# Patient Record
Sex: MALE | Race: WHITE | NOT HISPANIC OR LATINO | Employment: PART TIME | URBAN - METROPOLITAN AREA
[De-identification: names, ages, dates, MRNs, and addresses within clinical notes are randomized per-mention and may not be internally consistent; named-entity substitution may affect disease eponyms.]

---

## 2017-07-19 ENCOUNTER — RECORDS - HEALTHEAST (OUTPATIENT)
Dept: LAB | Facility: HOSPITAL | Age: 57
End: 2017-07-19

## 2017-07-19 LAB — AST SERPL W P-5'-P-CCNC: 19 U/L (ref 0–40)

## 2018-06-19 ENCOUNTER — RECORDS - HEALTHEAST (OUTPATIENT)
Dept: LAB | Facility: HOSPITAL | Age: 58
End: 2018-06-19

## 2018-06-19 LAB
AST SERPL W P-5'-P-CCNC: 32 U/L (ref 0–40)
BASOPHILS # BLD AUTO: 0 THOU/UL (ref 0–0.2)
BASOPHILS NFR BLD AUTO: 1 % (ref 0–2)
EOSINOPHIL # BLD AUTO: 0.1 THOU/UL (ref 0–0.4)
EOSINOPHIL NFR BLD AUTO: 3 % (ref 0–6)
ERYTHROCYTE [DISTWIDTH] IN BLOOD BY AUTOMATED COUNT: 12 % (ref 11–14.5)
HCT VFR BLD AUTO: 40.2 % (ref 40–54)
HGB BLD-MCNC: 13.7 G/DL (ref 14–18)
LYMPHOCYTES # BLD AUTO: 0.5 THOU/UL (ref 0.8–4.4)
LYMPHOCYTES NFR BLD AUTO: 13 % (ref 20–40)
MCH RBC QN AUTO: 31.6 PG (ref 27–34)
MCHC RBC AUTO-ENTMCNC: 34.1 G/DL (ref 32–36)
MCV RBC AUTO: 93 FL (ref 80–100)
MONOCYTES # BLD AUTO: 0.4 THOU/UL (ref 0–0.9)
MONOCYTES NFR BLD AUTO: 10 % (ref 2–10)
NEUTROPHILS # BLD AUTO: 3 THOU/UL (ref 2–7.7)
NEUTROPHILS NFR BLD AUTO: 73 % (ref 50–70)
PLATELET # BLD AUTO: 194 THOU/UL (ref 140–440)
PMV BLD AUTO: 10.8 FL (ref 8.5–12.5)
RBC # BLD AUTO: 4.33 MILL/UL (ref 4.4–6.2)
WBC: 4.2 THOU/UL (ref 4–11)

## 2019-03-06 ENCOUNTER — RECORDS - HEALTHEAST (OUTPATIENT)
Dept: LAB | Facility: HOSPITAL | Age: 59
End: 2019-03-06

## 2019-03-06 LAB
AST SERPL W P-5'-P-CCNC: 23 U/L (ref 0–40)
BASOPHILS # BLD AUTO: 0.1 THOU/UL (ref 0–0.2)
BASOPHILS NFR BLD AUTO: 1 % (ref 0–2)
EOSINOPHIL # BLD AUTO: 0.1 THOU/UL (ref 0–0.4)
EOSINOPHIL NFR BLD AUTO: 2 % (ref 0–6)
ERYTHROCYTE [DISTWIDTH] IN BLOOD BY AUTOMATED COUNT: 11.9 % (ref 11–14.5)
HCT VFR BLD AUTO: 40.6 % (ref 40–54)
HGB BLD-MCNC: 13.9 G/DL (ref 14–18)
LYMPHOCYTES # BLD AUTO: 0.7 THOU/UL (ref 0.8–4.4)
LYMPHOCYTES NFR BLD AUTO: 13 % (ref 20–40)
MCH RBC QN AUTO: 31.5 PG (ref 27–34)
MCHC RBC AUTO-ENTMCNC: 34.2 G/DL (ref 32–36)
MCV RBC AUTO: 92 FL (ref 80–100)
MONOCYTES # BLD AUTO: 0.5 THOU/UL (ref 0–0.9)
MONOCYTES NFR BLD AUTO: 9 % (ref 2–10)
NEUTROPHILS # BLD AUTO: 3.7 THOU/UL (ref 2–7.7)
NEUTROPHILS NFR BLD AUTO: 75 % (ref 50–70)
PLATELET # BLD AUTO: 193 THOU/UL (ref 140–440)
PMV BLD AUTO: 10.3 FL (ref 8.5–12.5)
RBC # BLD AUTO: 4.41 MILL/UL (ref 4.4–6.2)
TSH SERPL DL<=0.005 MIU/L-ACNC: 1.96 UIU/ML (ref 0.3–5)
VIT B12 SERPL-MCNC: 439 PG/ML (ref 213–816)
WBC: 5 THOU/UL (ref 4–11)

## 2019-08-27 ENCOUNTER — RECORDS - HEALTHEAST (OUTPATIENT)
Dept: LAB | Facility: HOSPITAL | Age: 59
End: 2019-08-27

## 2019-08-27 LAB
BASOPHILS # BLD AUTO: 0.1 THOU/UL (ref 0–0.2)
BASOPHILS NFR BLD AUTO: 1 % (ref 0–2)
EOSINOPHIL # BLD AUTO: 0.1 THOU/UL (ref 0–0.4)
EOSINOPHIL NFR BLD AUTO: 3 % (ref 0–6)
ERYTHROCYTE [DISTWIDTH] IN BLOOD BY AUTOMATED COUNT: 12.1 % (ref 11–14.5)
HCT VFR BLD AUTO: 39.5 % (ref 40–54)
HGB BLD-MCNC: 13.4 G/DL (ref 14–18)
LYMPHOCYTES # BLD AUTO: 0.6 THOU/UL (ref 0.8–4.4)
LYMPHOCYTES NFR BLD AUTO: 13 % (ref 20–40)
MCH RBC QN AUTO: 31.6 PG (ref 27–34)
MCHC RBC AUTO-ENTMCNC: 33.9 G/DL (ref 32–36)
MCV RBC AUTO: 93 FL (ref 80–100)
MONOCYTES # BLD AUTO: 0.5 THOU/UL (ref 0–0.9)
MONOCYTES NFR BLD AUTO: 11 % (ref 2–10)
NEUTROPHILS # BLD AUTO: 3.3 THOU/UL (ref 2–7.7)
NEUTROPHILS NFR BLD AUTO: 71 % (ref 50–70)
PLATELET # BLD AUTO: 183 THOU/UL (ref 140–440)
PMV BLD AUTO: 10.1 FL (ref 8.5–12.5)
RBC # BLD AUTO: 4.24 MILL/UL (ref 4.4–6.2)
WBC: 4.6 THOU/UL (ref 4–11)

## 2019-08-28 LAB — 25(OH)D3 SERPL-MCNC: 45.2 NG/ML (ref 30–80)

## 2020-11-24 PROBLEM — G35 RELAPSING REMITTING MULTIPLE SCLEROSIS (H): Status: ACTIVE | Noted: 2020-11-24

## 2021-03-26 DIAGNOSIS — R25.2 SPASTICITY: Primary | ICD-10-CM

## 2021-03-26 DIAGNOSIS — G35 RELAPSING REMITTING MULTIPLE SCLEROSIS (H): ICD-10-CM

## 2021-03-26 RX ORDER — BACLOFEN 10 MG/1
TABLET ORAL
Qty: 270 TABLET | Refills: 0 | Status: SHIPPED | OUTPATIENT
Start: 2021-03-26 | End: 2021-06-29

## 2021-03-26 NOTE — TELEPHONE ENCOUNTER
Refill request for baclofen.  Pt due for follow up appt. No future appt scheduled.  Letter mailed to pt.  Medication T'd for review and signature    Cristina Bo LPN on 3/26/2021 at 3:27 PM

## 2021-03-26 NOTE — LETTER
3/26/2021        RE: Festus Jackson  [address]  [facility] [address]              Dear Festus,        We recently provided you with medication refills.  Many medications require routine follow-up with your doctor.    Your prescription(s) have been refilled for 30 days so you may have time for the above noted follow-up. Please call to schedule soon so we can assure you have an appointment before your next refills are needed. If you have already made a follow up appointment, please disregard this letter.         Sincerely,        [facility] [place]     (Formerly known as [facility])

## 2021-06-29 DIAGNOSIS — R25.2 SPASTICITY: ICD-10-CM

## 2021-06-29 RX ORDER — BACLOFEN 10 MG/1
TABLET ORAL
Qty: 90 TABLET | Refills: 0 | Status: SHIPPED | OUTPATIENT
Start: 2021-06-29 | End: 2021-07-26

## 2021-06-29 NOTE — TELEPHONE ENCOUNTER
Refill request for baclofen.  Pt due for follow up. Letter has already been mailed to pt to schedule. No future appt scheduled.  Medication T'd for review and signature    Cristina Bo LPN on 6/29/2021 at 1:27 PM

## 2021-07-26 DIAGNOSIS — R25.2 SPASTICITY: ICD-10-CM

## 2021-07-26 RX ORDER — BACLOFEN 10 MG/1
10 TABLET ORAL 3 TIMES DAILY
Qty: 42 TABLET | Refills: 0 | Status: SHIPPED | OUTPATIENT
Start: 2021-07-26 | End: 2021-09-01

## 2021-07-26 NOTE — TELEPHONE ENCOUNTER
Refill request for baclofen  Letter already mailed to patient to schedule follow up  14 day supply with 0 refills Medication T'd for review and signature  Sushma Wheeler CMA on 7/26/2021 at 4:02 PM

## 2021-10-11 DIAGNOSIS — G35 MS (MULTIPLE SCLEROSIS) (H): ICD-10-CM

## 2021-10-11 RX ORDER — ESCITALOPRAM OXALATE 10 MG/1
10 TABLET ORAL DAILY
Qty: 5 TABLET | Refills: 0 | Status: SHIPPED | OUTPATIENT
Start: 2021-10-11 | End: 2022-05-04

## 2021-10-11 NOTE — TELEPHONE ENCOUNTER
Refill request for escitalopram.  Last seen 8/27/19. Letter has already been mailed to pt reminding to schedule. Previous refill for 7 day supply.  This refill for 5 day supply of Medication T'd for review and signature    Cristina Bo LPN on 10/11/2021 at 1:44 PM

## 2021-11-02 DIAGNOSIS — R25.2 SPASTICITY: ICD-10-CM

## 2021-11-02 RX ORDER — BACLOFEN 10 MG/1
TABLET ORAL
Qty: 15 TABLET | Refills: 0 | Status: SHIPPED | OUTPATIENT
Start: 2021-11-02 | End: 2021-12-07

## 2021-11-02 NOTE — TELEPHONE ENCOUNTER
Refill request for baclofen.  Last seen 8/27/19. Previous refill for 7 day supply.  5 day supply of Medication T'd for review and signature    Cristina Bo LPN on 11/2/2021 at 3:32 PM

## 2021-11-12 DIAGNOSIS — G35 RELAPSING REMITTING MULTIPLE SCLEROSIS (H): ICD-10-CM

## 2021-11-12 RX ORDER — GABAPENTIN 300 MG/1
CAPSULE ORAL
Qty: 180 CAPSULE | Refills: 1 | Status: SHIPPED | OUTPATIENT
Start: 2021-11-12 | End: 2022-05-04

## 2021-12-07 DIAGNOSIS — R25.2 SPASTICITY: ICD-10-CM

## 2021-12-07 RX ORDER — BACLOFEN 10 MG/1
TABLET ORAL
Qty: 90 TABLET | Refills: 5 | Status: SHIPPED | OUTPATIENT
Start: 2021-12-07 | End: 2022-05-04

## 2021-12-07 NOTE — TELEPHONE ENCOUNTER
Refill request for baclofen.  Pt has upcoming appt on 5/4/22.  Medication T'd for review and signature    Cristina Bo LPN on 12/7/2021 at 4:07 PM

## 2022-05-04 ENCOUNTER — OFFICE VISIT (OUTPATIENT)
Dept: NEUROLOGY | Facility: CLINIC | Age: 62
End: 2022-05-04
Payer: COMMERCIAL

## 2022-05-04 ENCOUNTER — LAB (OUTPATIENT)
Dept: LAB | Facility: HOSPITAL | Age: 62
End: 2022-05-04
Payer: COMMERCIAL

## 2022-05-04 VITALS
BODY MASS INDEX: 22.58 KG/M2 | HEART RATE: 66 BPM | HEIGHT: 68 IN | WEIGHT: 149 LBS | DIASTOLIC BLOOD PRESSURE: 85 MMHG | SYSTOLIC BLOOD PRESSURE: 130 MMHG

## 2022-05-04 DIAGNOSIS — G35 RELAPSING REMITTING MULTIPLE SCLEROSIS (H): ICD-10-CM

## 2022-05-04 DIAGNOSIS — F33.1 MODERATE EPISODE OF RECURRENT MAJOR DEPRESSIVE DISORDER (H): ICD-10-CM

## 2022-05-04 DIAGNOSIS — R25.2 SPASTICITY: ICD-10-CM

## 2022-05-04 DIAGNOSIS — R42 VERTIGO: ICD-10-CM

## 2022-05-04 DIAGNOSIS — G35 RELAPSING REMITTING MULTIPLE SCLEROSIS (H): Primary | ICD-10-CM

## 2022-05-04 DIAGNOSIS — G35 MS (MULTIPLE SCLEROSIS) (H): ICD-10-CM

## 2022-05-04 LAB
ANION GAP SERPL CALCULATED.3IONS-SCNC: 9 MMOL/L (ref 5–18)
AST SERPL W P-5'-P-CCNC: 28 U/L (ref 0–40)
BASOPHILS # BLD AUTO: 0 10E3/UL (ref 0–0.2)
BASOPHILS NFR BLD AUTO: 1 %
BUN SERPL-MCNC: 17 MG/DL (ref 8–22)
CALCIUM SERPL-MCNC: 9.9 MG/DL (ref 8.5–10.5)
CHLORIDE BLD-SCNC: 104 MMOL/L (ref 98–107)
CO2 SERPL-SCNC: 28 MMOL/L (ref 22–31)
CREAT SERPL-MCNC: 1.02 MG/DL (ref 0.7–1.3)
EOSINOPHIL # BLD AUTO: 0.1 10E3/UL (ref 0–0.7)
EOSINOPHIL NFR BLD AUTO: 2 %
ERYTHROCYTE [DISTWIDTH] IN BLOOD BY AUTOMATED COUNT: 11.9 % (ref 10–15)
GFR SERPL CREATININE-BSD FRML MDRD: 84 ML/MIN/1.73M2
GLUCOSE BLD-MCNC: 97 MG/DL (ref 70–125)
HCT VFR BLD AUTO: 42.6 % (ref 40–53)
HGB BLD-MCNC: 14.1 G/DL (ref 13.3–17.7)
IMM GRANULOCYTES # BLD: 0 10E3/UL
IMM GRANULOCYTES NFR BLD: 0 %
LYMPHOCYTES # BLD AUTO: 0.6 10E3/UL (ref 0.8–5.3)
LYMPHOCYTES NFR BLD AUTO: 14 %
MCH RBC QN AUTO: 31.1 PG (ref 26.5–33)
MCHC RBC AUTO-ENTMCNC: 33.1 G/DL (ref 31.5–36.5)
MCV RBC AUTO: 94 FL (ref 78–100)
MONOCYTES # BLD AUTO: 0.6 10E3/UL (ref 0–1.3)
MONOCYTES NFR BLD AUTO: 13 %
NEUTROPHILS # BLD AUTO: 3.1 10E3/UL (ref 1.6–8.3)
NEUTROPHILS NFR BLD AUTO: 70 %
NRBC # BLD AUTO: 0 10E3/UL
NRBC BLD AUTO-RTO: 0 /100
PLATELET # BLD AUTO: 166 10E3/UL (ref 150–450)
POTASSIUM BLD-SCNC: 4.2 MMOL/L (ref 3.5–5)
RBC # BLD AUTO: 4.53 10E6/UL (ref 4.4–5.9)
SODIUM SERPL-SCNC: 141 MMOL/L (ref 136–145)
WBC # BLD AUTO: 4.5 10E3/UL (ref 4–11)

## 2022-05-04 PROCEDURE — 85014 HEMATOCRIT: CPT

## 2022-05-04 PROCEDURE — 80048 BASIC METABOLIC PNL TOTAL CA: CPT

## 2022-05-04 PROCEDURE — 36415 COLL VENOUS BLD VENIPUNCTURE: CPT

## 2022-05-04 PROCEDURE — 99215 OFFICE O/P EST HI 40 MIN: CPT | Performed by: PSYCHIATRY & NEUROLOGY

## 2022-05-04 PROCEDURE — 84450 TRANSFERASE (AST) (SGOT): CPT

## 2022-05-04 RX ORDER — TERIFLUNOMIDE 14 MG/1
14 TABLET, FILM COATED ORAL DAILY
COMMUNITY

## 2022-05-04 RX ORDER — TAMSULOSIN HYDROCHLORIDE 0.4 MG/1
0.4 CAPSULE ORAL DAILY
COMMUNITY

## 2022-05-04 RX ORDER — ESCITALOPRAM OXALATE 10 MG/1
10 TABLET ORAL DAILY
Qty: 90 TABLET | Refills: 3 | Status: SHIPPED | OUTPATIENT
Start: 2022-05-04 | End: 2023-04-24

## 2022-05-04 RX ORDER — GABAPENTIN 300 MG/1
CAPSULE ORAL
Qty: 180 CAPSULE | Refills: 3 | Status: SHIPPED | OUTPATIENT
Start: 2022-05-04 | End: 2023-05-01

## 2022-05-04 RX ORDER — BACLOFEN 10 MG/1
TABLET ORAL
Qty: 180 TABLET | Refills: 3 | Status: SHIPPED | OUTPATIENT
Start: 2022-05-04 | End: 2023-05-01

## 2022-05-04 RX ORDER — MECLIZINE HYDROCHLORIDE 25 MG/1
25 TABLET ORAL 3 TIMES DAILY PRN
Qty: 90 TABLET | Refills: 4 | Status: SHIPPED | OUTPATIENT
Start: 2022-05-04 | End: 2022-11-21

## 2022-05-04 ASSESSMENT — MONTREAL COGNITIVE ASSESSMENT (MOCA)
10. [FLUENCY] NAME WORDS STARTING WITH DESIGNATED LETTER: 0
13. ORIENTATION SUBSCORE: 6
8. SERIAL SUBTRACTION OF 7S: 3
12. MEMORY INDEX SCORE: 3
VISUOSPATIAL/EXECUTIVE SUBSCORE: 5
7. [VIGILENCE] TAP WHEN HEARING DESIGNATED LETTER: 1
9. REPEAT EACH SENTENCE: 2
WHAT IS THE TOTAL SCORE (OUT OF 30): 27
11. FOR EACH PAIR OF WORDS, WHAT CATEGORY DO THEY BELONG TO (OUT OF 2): 2
6. READ LIST OF DIGITS [FORWARD/BACKWARD]: 2
WHAT LEVEL OF EDUCATION WAS ATTAINED: 0
4. NAME EACH OF THE THREE ANIMALS SHOWN: 3

## 2022-05-04 NOTE — PROGRESS NOTES
In person evaluation    HPI  8/27/2019, in person evaluation  5/4/2022, in person evaluation    62-year-old followed neurologically for:  Multiple sclerosis relapsing remitting  Chronic lymphopenia secondary to Tecfidera in the past  Depression    Last seen about 2 and half years ago      A.  Multiple sclerosis relapsing remitting       original diagnosis 1988, symptoms going on since age 18 or around 1978       lost to follow-up, re evaluated in 2001       Distant past treated with ACTH/multiple burst of steroids    MRI thoracic spine May 19, 2020  A.  Solitary midthoracic chronic demyelinating plaque  B.  No active demyelination  MRI cervical spine May 19, 2020  A.  3 upper cervical cord lesions consistent with MS chronic  B.  No active demyelinating lesions  MRI head May 19, 2020  A.  Moderate to severe T2 signal changes consistent with chronic MS supratentorial lesions       as lesions above need midbrain nettie and medulla as well as supratentorial  B.  No active lesions  C.  Thinning of the corpus callosum moderate in degree  D.  Moderate to severe T1 hypointense lesions  E.  Mild cerebral atrophy      Follows with a neurologist in [place] for his immune modulating medication  Stopped Tecfidera about a year ago and went on Aubagio         MS difficulties       Past optic neuritis left greater than right       Incoordination left greater than right       Urinary urgency       Difficulty with balance and gait       Depression/mood changes       Significant fatigue         disabled from competitive gainful employment due to MS.      B.  Lymphopenia secondary to Tecfidera in the past        Aubagio for about 1 year filled by his neurologist in [place]      C.  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              Brief review of neurologic history:  1. Multiple sclerosis, relapsing-remitting first diagnosed back in 1988 by Dr. Adelso Leon originally.  2. Lost to follow up and then again seen in our clinic in 2001.  3. Previously treated with ACTH early on and then multiple bursts of steroids for exacerbations in 2001 twice and 2003 and then in 2004, 2005, 2007, 2008, 2010, 2011, 2012. These were the large IV doses of steroids.    Patient s major difficulties have included:  1. Depression and mood problems with fairly anhedonic type symptoms.  2. Previous optic neuritis in the left eye greater than the right.  3. Difficulty with fluctuations with a lot of fatigue.  4. Incoordination worse on the left than the right, poor fine motor movements at times, symptoms worse with fatigue or being overheated.  5. Urinary urgency.  6. Gait difficulties and balance problems, which fluctuate with his fatigue, usually uses a single prong cane.    Family history:  1. Mother with MS in his biological mother.  2. Raised by adoptive parents.    Habits  Smoker  Alcohol 1-2/week  .  Previous work history summary included:  1. Worked as an  in 2004, had marked dexterity problems and troubles, was unable to continue with that occupation.  2. 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  3. 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  4. 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  5. Went back to school in 2012 at [employer] doing some computer graphics.  6. 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  7. 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  8. 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    Previous treatments have included:  1. ACTH early on.  2. Multiple sclerosis treated with high-dose burst of steroids.  3. Started Copaxone in 2004 and 2005.  4. Switched to Tecfidera in April 2016 due to needle fatigue.  5. Has been on lower doses of Tecfidera due to lymphopenia which is now chronic.      Past has had some oral bursts of steroids in:  A.  November 2016.  B.  July 2017.  C.  June 2018.  D. August 2019          Permanently disabled from competitive gainful employment  1. Disabled from competitive gainful employment which has been going on for quite some time.  2. Significant depression going on since 2003. Multiple medications tried in the past, currently on low-dose Lexapro which he tolerates.  3. A lot of fatigue phenomenon, talked about staying active and being safe with his gait.  4. Workup is outlined in the September 23, 2000 note for reference.  5. Previously had P100 latency of 160.5 on the left and 123 on the right when he had the optic neuritis.  MRI thoracic spine May 19, 2020  A.  Solitary midthoracic chronic demyelinating plaque  B.  No active demyelination  MRI cervical spine May 19, 2020  A.  3 upper cervical cord lesions consistent with MS chronic  B.  No active demyelinating lesions  MRI head May 19, 2020  A.  Moderate to severe T2 signal changes consistent with chronic MS supratentorial lesions       as lesions above need midbrain nettie and medulla as well as supratentorial  B.  No active lesions  C.  Thinning of the corpus callosum moderate in degree  D.  Moderate to severe T1 hypointense lesions  E.  Mild cerebral atrophy    Laboratory data review  Recent lymphocyte counts:  1. October 2016, 15%, absolute  count 0.6.  2. January 25, 2017 lymphocyte count 20%, absolute 1.0 on lower dose of Tecfidera.  3. July 19, 2017 lymphocyte count 18% with absolute count 0.7.  4. June 2018 lymphocyte 13% with absolute count 0.5.  5. March 2019, lymphocyte percent 13, absolute count 0.7    Laboratory review                        6/2019 8/2019 5/4/2022  Wbc/HGB     5.0/13.9      4.6/13.4     4.5/14.1    PLTs            193,000       183,000     166,000    Lymph           13/0.7         13/0.6        14/0.6   %/ABS    NA/K                                                   141/4.2    BUN/CR                                              17/1.02    GLU                                                     97      AST               23                                   28     B12               439    TSH              1.96    Vitamin D                            45.2      MoCA  9/4/2022     27 out of 30      Exam      Review of Systems     Pertinent findings on review of systems  Difficulty with fatigue  Difficulty with fluctuating mood  No actual diplopia dysarthria dysphasia  Clumsiness more on the left side  Gait difficulties with ataxia should be climbing on high ladders  Does use a walking stick    Does have some difficulty with his heartburn  Little bit of blurry vision intermittently    Has been placed on Flomax for some urinary urgency more so at nighttime still has it though    General exam  Alert orient x3  HEENT normal  Lungs clear  Heart regular  Abdomen soft  Symmetrical pulses  No edema the feet    Neurologic exam  Alert oriented x3  Prosody speech normal  Naming normal  Repetition normal  Comprehension normal  No neglect  Memory recall okay  MoCA 27 out of 30, 5/4/2022        Cranial 2 through 12 significant for  No ophthalmoplegia  Rare beat of nystagmus nonsustained intermittent subtle   visual fields intact  Visual fields intact  No Jamie Dandy phenomenon  Question blurriness disc margin chronic no red  desaturation      Upper extremities  Intention tremor bilaterally mild fluctuates  Dysmetria greater on the left than the right    Lower extremities  Relatively normal strength distal and proximal with negative straight leg raising sign    Gait  Slightly wide-based stance  does better with a assistive device if he is on uneven surfaces talked about gait safety    Reflexes fairly brisk no clonus equivocal Casey on the left   reflexes brisker on the left than the right      Assessment/Plan       1.  Multiple Sclerosis, Relapsing-Remitting (G35)        treated with Aubagio through his neurologist in [place] who is prescribing the medication      2.  Lymphopenia (D72.810)         Recheck CBC and platelets    3.  Left leg pain (M79.605)       Could be from his MS and some spasticity gets tired with too many medications      Add [employer]   topical magnesium rub    4.  Vertigo treated with meclizine as needed    5.  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       Currently is able to drive, does have a disability parking permit due to his high risk for falls.      Aubagio through his neurologist in [place]  Neurontin 300 mg capsule, 2 p.o. nightly for sleep and dysesthesias  Baclofen 10 mg p.o. 3 times daily for spasticity  Meclizine 25 mg 3 times daily as needed vertigo  Lexapro 10 mg once per day    Discussed depression and treatment      Check laboratory data  Counseled to stop smoking  Refilled medications    Follow-up in 6 months    As part of visit today  Reviewed MRI scan reports thoracic cervical and head performed in 2020  Reviewed urology notes 7/14/2020  Reviewed neurology notes from 2020    Total care time today 58 minutes.      Addendum 5/4/2022  Laboratory data 5/4/2022  Sodium 141 potassium 4.2  BUN 17 creatinine 1.02, glucose 97, AST 28  White blood count 4.5, hemoglobin 14.1, platelets 166,000  Lymphocyte percent 14, absolute 0.6           Yes

## 2022-05-04 NOTE — NURSING NOTE
[facility] COGNITIVE ASSESSMENT (MOCA)  Version 7.1 Original Version  VISUOSPATIAL/EXECUTIVE               COPY CUBE      [ 1   ]                                [ 1   ] DRAW CLOCK (Ten past eleven)  (3 points)    [ 1   ]                    [   1 ]               [ 1   ]       Contour            Numbers     Hands POINTS                  5 / 5   NAMING    [ 1  ]                                                                        [ 1   ]                                             [   1 ]  Lifredo Guadalupe                                Camel                    3 / 3   MEMORY Read list of words, subject must repeat them. Do 2 trials, even if 1st trial is successful. Do a recall after 5 minutes  FACE VELVET [place]que ELISA RED No Points    1st          2nd         ATTENTION Read list of digits (1 digit/sec) Subject has to repeat in the forward order       [  1  ]   2  1  8  5  4                                [  1  ] 7 4 2                        2  /2   Read list of letters. The subject must tap with his hand at each letter A. No points if > 2 errors.  [  1  ] F B A C M N A A J K L B A F A K D E A A A J A M O F A A B             1 /1   Serial 7 subtraction starting at 100          [ 1   ] 93         [  1  ] 86          [  1  ] 79          [ 1   ] 72         [  1  ] 65   4 or 5 correct subtractions: 3 points,  2 or 3 correct: 2 points,  1correct: 1 point,   0 correct: 0 points           3 /3   LANGUAGE Repeat: I only know that Kristian is the one to help today. [   1  ]                                      The cat always hid under the couch when dogs were in the room. [  1 ]              2 /2   Fluency: Name maximum number of words in one minute that begin with the letter F                                                                                                                    [  0  ] _6__ (N > 11 words)              0 /1   ABSTRACTION Similarity  between e.g. banana-orange=fruit                                                                   [   1 ] train-bicycle                      [  1  ] watch-ruler             2 /2   DELAYED  RECALL Has to recall words  WITH NO CUE FACE  [  1  ] VELVET  [   1 ] [facility]  [ 1   ]  ELISA  [  0  ] RED  [ 0   ] Points for UNCUED recall only           3 /5           OPTIONAL Category cue           Multiple choice cue          ORIENTATION  [  1  ] Date     [ 1   ] Month       [ 1   ] Year      [  1  ] Day      [   1 ] Place        [  1  ] City         6 /6   TOTAL  Normal > 26/30 Add 1 point if < 12 years education       27 /30

## 2022-05-04 NOTE — LETTER
May 4, 2022      Festus JARAMILLO Renetta  [address] [place] [address]        Dear ,    We are writing to inform you of your test results.    Laboratory data 5/4/2022  Sodium 141 potassium 4.2 BUN 17 creatinine 1.02, glucose 97, AST 28  White blood count 4.5, hemoglobin 14.1, platelets 166,000  Lymphocyte percent 14, absolute 0.6    Your laboratory data above are stable continue as planned  If you have any questions or concerns, please call the clinic at the number listed above.     Sincerely,      Dr. Jefry Mc

## 2022-05-04 NOTE — LETTER
5/4/2022         RE: Festus Jackson  [address]  [place] [address]        Dear Colleague,    Thank you for referring your patient, Festus Jackson, to the [facility] [place]. Please see a copy of my visit note below.    In person evaluation    HPI  8/27/2019, in person evaluation  5/4/2022, in person evaluation    62-year-old followed neurologically for:  Multiple sclerosis relapsing remitting  Chronic lymphopenia secondary to Tecfidera in the past  Depression    Last seen about 2 and half years ago      A.  Multiple sclerosis relapsing remitting       original diagnosis 1988, symptoms going on since age 18 or around 1978       lost to follow-up, re evaluated in 2001       Distant past treated with ACTH/multiple burst of steroids    MRI thoracic spine May 19, 2020  A.  Solitary midthoracic chronic demyelinating plaque  B.  No active demyelination  MRI cervical spine May 19, 2020  A.  3 upper cervical cord lesions consistent with MS chronic  B.  No active demyelinating lesions  MRI head May 19, 2020  A.  Moderate to severe T2 signal changes consistent with chronic MS supratentorial lesions       as lesions above need midbrain nettie and medulla as well as supratentorial  B.  No active lesions  C.  Thinning of the corpus callosum moderate in degree  D.  Moderate to severe T1 hypointense lesions  E.  Mild cerebral atrophy      Follows with a neurologist in [place] for his immune modulating medication  Stopped Tecfidera about a year ago and went on Aubagio         MS difficulties       Past optic neuritis left greater than right       Incoordination left greater than right       Urinary urgency       Difficulty with balance and gait       Depression/mood changes       Significant fatigue         disabled from competitive gainful employment due to MS.      B.  Lymphopenia secondary to Tecfidera in the past        Aubagio for about 1 year filled by his neurologist in [place]      C.  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              Brief review of neurologic history:  1. Multiple sclerosis, relapsing-remitting first diagnosed back in 1988 by Dr. Adelso Leon originally.  2. Lost to follow up and then again seen in our clinic in 2001.  3. Previously treated with ACTH early on and then multiple bursts of steroids for exacerbations in 2001 twice and 2003 and then in 2004, 2005, 2007, 2008, 2010, 2011, 2012. These were the large IV doses of steroids.    Patient s major difficulties have included:  1. Depression and mood problems with fairly anhedonic type symptoms.  2. Previous optic neuritis in the left eye greater than the right.  3. Difficulty with fluctuations with a lot of fatigue.  4. Incoordination worse on the left than the right, poor fine motor movements at times, symptoms worse with fatigue or being overheated.  5. Urinary urgency.  6. Gait difficulties and balance problems, which fluctuate with his fatigue, usually uses a single prong cane.    Family history:  1. Mother with MS in his biological mother.  2. Raised by adoptive parents.    Habits  Smoker  Alcohol 1-2/week  .  Previous work history summary included:  1. Worked as an  in 2004, had marked dexterity problems and troubles, was unable to continue with that occupation.  2. 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  3. 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  4. 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  5. Went back to school in 2012 at [employer] doing some computer graphics.  6. 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  8. 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    Previous treatments have included:  1. ACTH early on.  2. Multiple sclerosis treated with high-dose burst of steroids.  3. Started Copaxone in 2004 and 2005.  4. Switched to Tecfidera in April 2016 due to needle fatigue.  5. Has been on lower doses of Tecfidera due to lymphopenia which is now chronic.      Past has had some oral bursts of steroids in:  A.  November 2016.  B.  July 2017.  C.  June 2018.  D. August 2019          Permanently disabled from competitive gainful employment  1. Disabled from competitive gainful employment which has been going on for quite some time.  2. Significant depression going on since 2003. Multiple medications tried in the past, currently on low-dose Lexapro which he tolerates.  3. A lot of fatigue phenomenon, talked about staying active and being safe with his gait.  4. Workup is outlined in the September 23, 2000 note for reference.  5. Previously had P100 latency of 160.5 on the left and 123 on the right when he had the optic neuritis.  MRI thoracic spine May 19, 2020  A.  Solitary midthoracic chronic demyelinating plaque  B.  No active demyelination  MRI cervical spine May 19, 2020  A.  3 upper cervical cord lesions consistent with MS chronic  B.  No active demyelinating lesions  MRI head May 19, 2020  A.  Moderate to severe T2 signal changes consistent with chronic MS supratentorial lesions       as lesions above need midbrain nettie and medulla as well  as supratentorial  B.  No active lesions  C.  Thinning of the corpus callosum moderate in degree  D.  Moderate to severe T1 hypointense lesions  E.  Mild cerebral atrophy    Laboratory data review  Recent lymphocyte counts:  1. October 2016, 15%, absolute count 0.6.  2. January 25, 2017 lymphocyte count 20%, absolute 1.0 on lower dose of Tecfidera.  3. July 19, 2017 lymphocyte count 18% with absolute count 0.7.  4. June 2018 lymphocyte 13% with absolute count 0.5.  5. March 2019, lymphocyte percent 13, absolute count 0.7    Laboratory review                        6/2019 8/2019  Wbc/HGB     5.0/13.9      4.6/13.4    PLTs            193,000       183,000    Lymph           13/0.7         13/0.6  %/ABS    AST               23    B12               439    TSH              1.96    Vitamin D                            45.2      MoCA  9/4/2022     27 out of 30      Exam      Review of Systems     Pertinent findings on review of systems  Difficulty with fatigue  Difficulty with fluctuating mood  No actual diplopia dysarthria dysphasia  Clumsiness more on the left side  Gait difficulties with ataxia should be climbing on high ladders  Does use a walking stick    Does have some difficulty with his heartburn  Little bit of blurry vision intermittently    Has been placed on Flomax for some urinary urgency more so at nighttime still has it though    General exam  Alert orient x3  HEENT normal  Lungs clear  Heart regular  Abdomen soft  Symmetrical pulses  No edema the feet    Neurologic exam  Alert oriented x3  Prosody speech normal  Naming normal  Repetition normal  Comprehension normal  No neglect  Memory recall okay  MoCA 27 out of 30, 5/4/2022        Cranial 2 through 12 significant for  No ophthalmoplegia  Rare beat of nystagmus nonsustained intermittent subtle   visual fields intact  Visual fields intact  No Jamie Dandy phenomenon  Question blurriness disc margin chronic no red desaturation      Upper  extremities  Intention tremor bilaterally mild fluctuates  Dysmetria greater on the left than the right    Lower extremities  Relatively normal strength distal and proximal with negative straight leg raising sign    Gait  Slightly wide-based stance  does better with a assistive device if he is on uneven surfaces talked about gait safety    Reflexes fairly brisk no clonus equivocal Casey on the left   reflexes brisker on the left than the right      Assessment/Plan       1.  Multiple Sclerosis, Relapsing-Remitting (G35)        treated with Aubagio through his neurologist in [place] who is prescribing the medication      2.  Lymphopenia (D72.810)         Recheck CBC and platelets    3.  Left leg pain (M79.605)       Could be from his MS and some spasticity gets tired with too many medications      Add [employer]   topical magnesium rub    4.  Vertigo treated with meclizine as needed    5.  Permanently disabled from competitive gainful employment       Is disabled from competitive gainful employment,        In the past does a little bit of part-time teaching of [employer], and sometimes [employer],        works less than 15 hours per week.       Currently is able to drive, does have a disability parking permit due to his high risk for falls.      Ralph through his neurologist in [place]  Neurontin 300 mg capsule, 2 p.o. nightly for sleep and dysesthesias  Baclofen 10 mg p.o. 3 times daily for spasticity  Meclizine 25 mg 3 times daily as needed vertigo  Lexapro 10 mg once per day    Discussed depression and treatment      Check laboratory data  Counseled to stop smoking  Refilled medications    Follow-up in 6 months    As part of visit today  Reviewed MRI scan reports thoracic cervical and head performed in 2020  Reviewed urology notes 7/14/2020  Reviewed neurology notes from 2020    Total care time today 58 minutes.                    Again, thank you for allowing me to participate in the care of your patient.         Sincerely,        Jefry Mc MD

## 2022-05-04 NOTE — NURSING NOTE
Chief Complaint   Patient presents with     Multiple Sclerosis     Pt states he is doing well. Left leg is getting weaker.     Cristina Bo LPN on 5/4/2022 at 10:08 AM

## 2022-11-21 DIAGNOSIS — R42 VERTIGO: ICD-10-CM

## 2022-11-21 DIAGNOSIS — G35 MS (MULTIPLE SCLEROSIS) (H): ICD-10-CM

## 2022-11-21 RX ORDER — MECLIZINE HYDROCHLORIDE 25 MG/1
25 TABLET ORAL 3 TIMES DAILY PRN
Qty: 30 TABLET | Refills: 0 | Status: SHIPPED | OUTPATIENT
Start: 2022-11-21 | End: 2023-09-26

## 2022-11-21 NOTE — TELEPHONE ENCOUNTER
Pt has appt scheduled today.     Tl sent refill request for Meclizine 25MG 1 tab TID. #90    Routing to provider as FYI to refill at today's appt 2pm.     Shane Watkins RN, BSN  [facility]

## 2023-04-24 DIAGNOSIS — G35 MS (MULTIPLE SCLEROSIS) (H): ICD-10-CM

## 2023-04-24 RX ORDER — ESCITALOPRAM OXALATE 10 MG/1
10 TABLET ORAL DAILY
Qty: 90 TABLET | Refills: 2 | Status: SHIPPED | OUTPATIENT
Start: 2023-04-24 | End: 2024-02-19

## 2023-04-24 NOTE — TELEPHONE ENCOUNTER
Refill request for: escitalopram 10mg   Directions: Take 1 tablet (10 mg) by mouth daily     LOV: 05/04/22  NOV: 11/28/23    90 day supply with 2 refills Medication T'd for review and signature      Cristina Bo LPN on 4/24/2023 at 10:38 AM

## 2023-05-01 DIAGNOSIS — G35 RELAPSING REMITTING MULTIPLE SCLEROSIS (H): ICD-10-CM

## 2023-05-01 DIAGNOSIS — R25.2 SPASTICITY: ICD-10-CM

## 2023-05-01 RX ORDER — GABAPENTIN 300 MG/1
CAPSULE ORAL
Qty: 180 CAPSULE | Refills: 1 | Status: SHIPPED | OUTPATIENT
Start: 2023-05-01 | End: 2023-10-30

## 2023-05-01 RX ORDER — BACLOFEN 10 MG/1
TABLET ORAL
Qty: 270 TABLET | Refills: 1 | Status: SHIPPED | OUTPATIENT
Start: 2023-05-01 | End: 2024-07-01

## 2023-05-01 NOTE — TELEPHONE ENCOUNTER
Refill request for: gabapentin 300mg   Directions: 2 capsules at bedtime    Refill request for: baclofen 10mg   Directions: : TAKE 1 TABLET BY MOUTH TWICE TIMES DAILY    LOV: 05/04/22  NOV: 11/28/23    90 day supply with 1 refills Medication T'd for review and signature    Cristina Bo LPN on 5/1/2023 at 11:02 AM

## 2023-05-01 NOTE — TELEPHONE ENCOUNTER
Tl sent refill requests for Gabapentin 300 mg 2 tabs at bedtime # 180  and Baclofen 10 mg 1 tab bid # 180

## 2023-09-25 DIAGNOSIS — R42 VERTIGO: ICD-10-CM

## 2023-09-25 DIAGNOSIS — G35 MS (MULTIPLE SCLEROSIS) (H): ICD-10-CM

## 2023-09-25 NOTE — TELEPHONE ENCOUNTER
Refill request for: meclizine (ANTIVERT) 25 MG tablet    Directions: Take 1 tablet (25 mg) by mouth 3 times daily as needed for dizziness     LOV: 5/4/22  NOV: 11/28/23    #30 with 0 refills Medication T'd for review and signature    Sushma Wheeler CMA on 9/25/2023 at 12:11 PM  [facility]

## 2023-09-26 RX ORDER — MECLIZINE HYDROCHLORIDE 25 MG/1
25 TABLET ORAL 3 TIMES DAILY PRN
Qty: 30 TABLET | Refills: 0 | Status: SHIPPED | OUTPATIENT
Start: 2023-09-26 | End: 2023-10-23

## 2023-10-23 DIAGNOSIS — R42 VERTIGO: ICD-10-CM

## 2023-10-23 DIAGNOSIS — G35 MS (MULTIPLE SCLEROSIS) (H): ICD-10-CM

## 2023-10-23 RX ORDER — MECLIZINE HYDROCHLORIDE 25 MG/1
25 TABLET ORAL 3 TIMES DAILY PRN
Qty: 30 TABLET | Refills: 0 | Status: SHIPPED | OUTPATIENT
Start: 2023-10-23 | End: 2023-12-29

## 2023-10-23 NOTE — TELEPHONE ENCOUNTER
Refill request for: Meclizine    Directions: Take 1 tablet TID as needed      LOV: 11/21/22  NOV: 11/28/23    30 day supply with 0 refills Medication T'd for review and signature

## 2023-10-29 DIAGNOSIS — G35 RELAPSING REMITTING MULTIPLE SCLEROSIS (H): ICD-10-CM

## 2023-10-30 RX ORDER — GABAPENTIN 300 MG/1
CAPSULE ORAL
Qty: 60 CAPSULE | Refills: 0 | Status: SHIPPED | OUTPATIENT
Start: 2023-10-30 | End: 2023-12-04

## 2023-10-30 NOTE — TELEPHONE ENCOUNTER
Refill request for: gabapentin (NEURONTIN) 300 MG capsule    Directions: 2 capsules at bedtime     LOV: 5/4/22  NOV: 11/28/23    30 day supply with 0 refills Medication T'd for review and signature  Sushma Wheeler CMA on 10/30/2023 at 12:10 PM  [facility]

## 2023-12-04 DIAGNOSIS — G35 RELAPSING REMITTING MULTIPLE SCLEROSIS (H): ICD-10-CM

## 2023-12-04 RX ORDER — GABAPENTIN 300 MG/1
CAPSULE ORAL
Qty: 60 CAPSULE | Refills: 7 | Status: SHIPPED | OUTPATIENT
Start: 2023-12-04 | End: 2024-07-01

## 2023-12-04 NOTE — TELEPHONE ENCOUNTER
Refill request for: gabapentin 300mg   Directions: TAKE 2 CAPSULES BY MOUTH AT BEDTIME     LOV: 05/04/22  NOV: 07/02/24    30 day supply with 7 refills Medication T'd for review and signature    Cristina Bo LPN on 12/4/2023 at 12:49 PM

## 2023-12-28 DIAGNOSIS — G35 MS (MULTIPLE SCLEROSIS) (H): ICD-10-CM

## 2023-12-28 DIAGNOSIS — R42 VERTIGO: ICD-10-CM

## 2023-12-28 NOTE — TELEPHONE ENCOUNTER
Refill request for: Meclizine    Directions: Take 1 tablet TID as needed     LOV: 11/21/22  NOV: 7/2/24 ( Cancelled 11/28/23)     30 day supply with 1 refills Medication T'd for review and signature

## 2023-12-29 RX ORDER — MECLIZINE HYDROCHLORIDE 25 MG/1
25 TABLET ORAL 3 TIMES DAILY PRN
Qty: 30 TABLET | Refills: 1 | Status: SHIPPED | OUTPATIENT
Start: 2023-12-29 | End: 2024-05-22

## 2024-02-19 DIAGNOSIS — G35 MS (MULTIPLE SCLEROSIS) (H): ICD-10-CM

## 2024-02-19 RX ORDER — ESCITALOPRAM OXALATE 10 MG/1
10 TABLET ORAL DAILY
Qty: 30 TABLET | Refills: 0 | Status: SHIPPED | OUTPATIENT
Start: 2024-02-19 | End: 2024-07-01

## 2024-02-19 NOTE — TELEPHONE ENCOUNTER
Refill request for: escitalopram (LEXAPRO) 10 MG tablet    Directions: Take 1 tablet (10 mg) by mouth daily     LOV: 5/4/22  NOV: 7/2/24- Per notes, appt needs to be re-scheduled as MD is out of office. Patient was a NO SHOW 11/21/22 and canceled November 2023 visit.     30 day supply with 0 refills Medication T'd for review and signature  Sushma Wheeler CMA on 2/19/2024 at 12:07 PM  [facility]

## 2024-05-22 DIAGNOSIS — R42 VERTIGO: ICD-10-CM

## 2024-05-22 DIAGNOSIS — G35 MS (MULTIPLE SCLEROSIS) (H): ICD-10-CM

## 2024-05-22 RX ORDER — MECLIZINE HYDROCHLORIDE 25 MG/1
25 TABLET ORAL 3 TIMES DAILY PRN
Qty: 30 TABLET | Refills: 0 | Status: SHIPPED | OUTPATIENT
Start: 2024-05-22 | End: 2024-07-01

## 2024-05-22 NOTE — TELEPHONE ENCOUNTER
Refill request for: Meclizine      Directions: Take 1 tablet TID as needed      LOV: 11/21/22  NOV: 7/2/24 ( Cancelled 11/28/23)      30 day supply with 0 refills Medication T'd for review and signature   Sushma Wheeler CMA on 5/22/2024 at 10:53 AM  [facility]

## 2024-06-28 NOTE — PROGRESS NOTES
In person evaluation        HPI  8/27/2019, in person evaluation  5/4/2022, in person evaluation  11/21/2022, no-show visit  7/1/2024, in person visit    64-year-old followed neurologically for:  Multiple sclerosis relapsing remitting  Chronic lymphopenia secondary to Tecfidera in the past  Depression    Since last seen May 2022 greater than 2 years ago      Does use meclizine for chronic dizziness  Was seen by urologist 10/13/2023 for difficulty with nocturia  Does have some BPH has had cystoscope be in the past 7/2020  Has been on some Flomax/Proscar  Postvoid residual 23 mL (7/1/2024  Follows with a neurologist in [place] who had him on Aubagio    Patient has had a lot of dry mouth which could be from his medications  I noticed that he was actually on 3 medicines for his bladder and probably should stop the Ditropan    Since last seen  No hospitalization  No surgeries  No major illnesses that required care  No falls or injuries    Chronic symptoms  Adynamic/depression  Dry mouth with trouble swallowing  Ataxic  Decreased vibration sense lower extremities worse than upper    Lives with his girlfriend  They both do some cooking  Does eat  Weight is stable at around 137 pounds  Does take his vitamin D3  Aubagio filled by other neurologist    Does do art work with acrylic painting he showed me multiple projects  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  Multiple sclerosis relapsing remitting       original diagnosis 1988, symptoms going on since age 18 or around 1978       lost to follow-up, re evaluated in 2001       Distant past treated with ACTH/multiple burst of steroids    MRI thoracic spine May 19, 2020  A.  Solitary midthoracic chronic demyelinating plaque  B.  No active demyelination  MRI cervical spine May 19, 2020  A.  3 upper cervical cord lesions consistent with MS  "chronic  B.  No active demyelinating lesions  MRI head May 19, 2020  A.  Moderate to severe T2 signal changes consistent with chronic MS supratentorial lesions       as lesions above need midbrain nettie and medulla as well as supratentorial  B.  No active lesions  C.  Thinning of the corpus callosum moderate in degree  D.  Moderate to severe T1 hypointense lesions  E.  Mild cerebral atrophy      Follows with a neurologist in [place] for his immune modulating medication  Stopped Tecfidera about a year ago and went on Aubagio         MS difficulties       Past optic neuritis left greater than right       Incoordination left greater than right       Urinary urgency       Difficulty with balance and gait       Depression/mood changes       Significant fatigue         disabled from competitive gainful employment due to MS.      B.  Lymphopenia secondary to Tecfidera in the past        Aubagio for about 1 year filled by his neurologist in [place]      C.  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              Brief review of neurologic history:  1. Multiple sclerosis, relapsing-remitting first diagnosed back in 1988 by Dr. Adelso Leon originally.  2. Lost to follow up and then again seen in our clinic in 2001.  3. Previously treated with ACTH early on and then multiple bursts of steroids for exacerbations in 2001 twice and 2003 and then in 2004, 2005, 2007, 2008, 2010, 2011, 2012. These were the large IV doses of steroids.    Patient s major difficulties have included:  1. Depression and mood problems with fairly anhedonic type symptoms.  2. Previous optic neuritis in the left eye greater than " the right.  3. Difficulty with fluctuations with a lot of fatigue.  4. Incoordination worse on the left than the right, poor fine motor movements at times, symptoms worse with fatigue or being overheated.  5. Urinary urgency.  6. Gait difficulties and balance problems, which fluctuate with his fatigue, usually uses a single prong cane.    Family history:  1. Mother with MS in his biological mother.  2. Raised by adoptive parents.    Habits  Smoker  Alcohol 1-2/week  .  Previous work history summary included:  1. Worked as an  in 2004, had marked dexterity problems and troubles, was unable to continue with that occupation.  2. 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  3. 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  4. 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  5. Went back to school in 2012 at [employer] doing some computer graphics.  6. 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  8. 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    Previous treatments have included:  1. ACTH early on.  2. Multiple sclerosis treated with high-dose burst of steroids.  3. Started Copaxone in 2004 and 2005.  4. Switched to Tecfidera in April 2016 due to needle fatigue.  5. Has been on lower doses of Tecfidera due to lymphopenia which is now chronic.      Past has had some oral bursts of steroids in:  A.  November 2016.  B.  July 2017.  C.  June 2018.  D. August 2019          Permanently  disabled from competitive gainful employment  1. Disabled from competitive gainful employment which has been going on for quite some time.  2. Significant depression going on since 2003. Multiple medications tried in the past, currently on low-dose Lexapro which he tolerates.  3. A lot of fatigue phenomenon, talked about staying active and being safe with his gait.  4. Workup is outlined in the September 23, 2000 note for reference.  5. Previously had P100 latency of 160.5 on the left and 123 on the right when he had the optic neuritis.  MRI thoracic spine May 19, 2020  A.  Solitary midthoracic chronic demyelinating plaque  B.  No active demyelination  MRI cervical spine May 19, 2020  A.  3 upper cervical cord lesions consistent with MS chronic  B.  No active demyelinating lesions  MRI head May 19, 2020  A.  Moderate to severe T2 signal changes consistent with chronic MS supratentorial lesions       as lesions above need midbrain nettie and medulla as well as supratentorial  B.  No active lesions  C.  Thinning of the corpus callosum moderate in degree  D.  Moderate to severe T1 hypointense lesions  E.  Mild cerebral atrophy    Laboratory data review  Recent lymphocyte counts:  1. October 2016, 15%, absolute count 0.6.  2. January 25, 2017 lymphocyte count 20%, absolute 1.0 on lower dose of Tecfidera.  3. July 19, 2017 lymphocyte count 18% with absolute count 0.7.  4. June 2018 lymphocyte 13% with absolute count 0.5.  5. March 2019, lymphocyte percent 13, absolute count 0.7    Laboratory review                        6/2019 8/2019 5/4/2022  Wbc/HGB     5.0/13.9      4.6/13.4     4.5/14.1  PLTs            193,000       183,000     166,000  Lymph           13/0.7         13/0.6        14/0.6   %/ABS    NA/K                                                   141/4.2  BUN/CR                                              17/1.02  GLU                                                     97  AST               23                                   28  B12               439  TSH              1.96    Vitamin D                            45.2      MoCA  5/4/2022     27 out of 30  7/1/2024     26 out of 30    Exam      Review of Systems     Pertinent findings on review of systems  Difficulty with fatigue  Difficulty with fluctuating mood  No actual diplopia dysarthria dysphasia  Clumsiness more on the left side  Gait difficulties with ataxia should be climbing on high ladders  Does use a walking stick    Does have some difficulty with his heartburn  Little bit of blurry vision intermittently    Has been placed on Flomax for some urinary urgency more so at nighttime still has it though  Also uses some Ditropan follows with urology, postvoid residual small    General exam  Blood pressure 113/65, pulse 76  Alert orient x3  HEENT normal  Lungs clear  Heart regular  Abdomen soft  Symmetrical pulses  No edema the feet    Neurologic exam  Alert oriented x3  Prosody speech normal  Naming normal  Repetition normal  Comprehension normal  No neglect  Memory recall okay    MoCA  5/4/2022     27 out of 30  7/1/2024     26 out of 30        Cranial 2 through 12 significant for  No ophthalmoplegia  Rare beat of nystagmus nonsustained intermittent subtle  Visual fields intact  No Jamie Dandy phenomenon  Decreased vision in the left eye compared to the right  Slight red desaturation on the left compared to the right subtle  Optokinetic nystagmus seems intact      Upper extremities  Intention tremor bilaterally mild fluctuates  Dysmetria greater on the left than the right    Lower extremities  Relatively normal strength distal and proximal with negative straight leg raising sign    Gait  Slightly wide-based stance  does better with a assistive device if he is on uneven surfaces talked about gait safety  Some difficulty with marching in place with his eyes closed even with his legs far apart    Reflexes fairly brisk no clonus equivocal Casey on the  left  Reflexes brisker on the left than the right    Decreased vibratory sense in the lower extremities compared to the upper        Assessment/Plan       1.  Multiple Sclerosis, Relapsing-Remitting (G35)        treated with Aubagio through his neurologist in [place] who is prescribing the medication      2.  Lymphopenia (D72.810)         Recheck CBC and platelets    3.  Left leg pain (M79.605)       Could be from his MS and some spasticity gets tired with too many medications      Add Theraworks   topical magnesium rub    4.  Vertigo treated with meclizine as needed    5.  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       Currently is able to drive, does have a disability parking permit due to his high risk for falls.    MoCA  5/4/2022     27 out of 30  7/1/2024     26 out of 30        Aubagio through his neurologist in [place]  Neurontin 300 mg capsule, 2 p.o. nightly for sleep and dysesthesias  Baclofen 10 mg p.o. 3 times daily for spasticity (sometimes just uses twice per day)  Meclizine 25 mg 3 times daily as needed vertigo  Lexapro 10 mg once per day    Discussed depression and treatment    Recommend getting laboratory data  Refilled medications  Labs ordered    Multiple issues as above discussed and reviewed  Follow-up in 1 year    Total care time today 40 minutes  The longitudinal plan of care for the diagnosis(es)/condition(s) as documented were addressed during this visit. Due to the added complexity in care, I will continue to support Festus in the subsequent management and with ongoing continuity of care.      As per the visit today  Reviewed EMR notes  Reviewed urology notes  Reviewed medications  Refilled medications and discussed side effects and benefits  Labs ordered

## 2024-07-01 ENCOUNTER — OFFICE VISIT (OUTPATIENT)
Dept: NEUROLOGY | Facility: CLINIC | Age: 64
End: 2024-07-01
Payer: COMMERCIAL

## 2024-07-01 VITALS
SYSTOLIC BLOOD PRESSURE: 113 MMHG | HEIGHT: 68 IN | WEIGHT: 140 LBS | DIASTOLIC BLOOD PRESSURE: 65 MMHG | HEART RATE: 76 BPM | BODY MASS INDEX: 21.22 KG/M2

## 2024-07-01 DIAGNOSIS — G35 MS (MULTIPLE SCLEROSIS) (H): ICD-10-CM

## 2024-07-01 DIAGNOSIS — G35 RELAPSING REMITTING MULTIPLE SCLEROSIS (H): Primary | ICD-10-CM

## 2024-07-01 DIAGNOSIS — R42 VERTIGO: ICD-10-CM

## 2024-07-01 DIAGNOSIS — R25.2 SPASTICITY: ICD-10-CM

## 2024-07-01 PROCEDURE — 99215 OFFICE O/P EST HI 40 MIN: CPT | Performed by: PSYCHIATRY & NEUROLOGY

## 2024-07-01 PROCEDURE — G2211 COMPLEX E/M VISIT ADD ON: HCPCS | Performed by: PSYCHIATRY & NEUROLOGY

## 2024-07-01 RX ORDER — BACLOFEN 10 MG/1
TABLET ORAL
Qty: 270 TABLET | Refills: 3 | Status: SHIPPED | OUTPATIENT
Start: 2024-07-01

## 2024-07-01 RX ORDER — GABAPENTIN 300 MG/1
CAPSULE ORAL
Qty: 180 CAPSULE | Refills: 3 | Status: SHIPPED | OUTPATIENT
Start: 2024-07-01

## 2024-07-01 RX ORDER — ATORVASTATIN CALCIUM 80 MG/1
1 TABLET, FILM COATED ORAL
COMMUNITY
Start: 2024-04-29

## 2024-07-01 RX ORDER — FINASTERIDE 5 MG/1
TABLET, FILM COATED ORAL
COMMUNITY

## 2024-07-01 RX ORDER — OXYBUTYNIN CHLORIDE 5 MG/1
TABLET ORAL
COMMUNITY
Start: 2024-01-10 | End: 2024-07-01

## 2024-07-01 RX ORDER — ESCITALOPRAM OXALATE 10 MG/1
10 TABLET ORAL DAILY
Qty: 90 TABLET | Refills: 3 | Status: SHIPPED | OUTPATIENT
Start: 2024-07-01

## 2024-07-01 RX ORDER — MECLIZINE HYDROCHLORIDE 25 MG/1
25 TABLET ORAL 3 TIMES DAILY PRN
Qty: 30 TABLET | Refills: 3 | Status: SHIPPED | OUTPATIENT
Start: 2024-07-01

## 2024-07-01 ASSESSMENT — MONTREAL COGNITIVE ASSESSMENT (MOCA)
13. ORIENTATION SUBSCORE: 6
VISUOSPATIAL/EXECUTIVE SUBSCORE: 4
WHAT LEVEL OF EDUCATION WAS ATTAINED: 0
WHAT IS THE TOTAL SCORE (OUT OF 30): 26
11. FOR EACH PAIR OF WORDS, WHAT CATEGORY DO THEY BELONG TO (OUT OF 2): 2
9. REPEAT EACH SENTENCE: 2
6. READ LIST OF DIGITS [FORWARD/BACKWARD]: 2
12. MEMORY INDEX SCORE: 4
8. SERIAL SUBTRACTION OF 7S: 2
7. [VIGILENCE] TAP WHEN HEARING DESIGNATED LETTER: 1
10. [FLUENCY] NAME WORDS STARTING WITH DESIGNATED LETTER: 0
4. NAME EACH OF THE THREE ANIMALS SHOWN: 3

## 2024-07-01 NOTE — LETTER
7/1/2024      Festus Jackson  [address]  [place] [address]      Dear Colleague,    Thank you for referring your patient, Festus Jackson, to the [facility] [place]. Please see a copy of my visit note below.    In person evaluation        HPI  8/27/2019, in person evaluation  5/4/2022, in person evaluation  11/21/2022, no-show visit  7/1/2024, in person visit    64-year-old followed neurologically for:  Multiple sclerosis relapsing remitting  Chronic lymphopenia secondary to Tecfidera in the past  Depression    Since last seen May 2022 greater than 2 years ago      Does use meclizine for chronic dizziness  Was seen by urologist 10/13/2023 for difficulty with nocturia  Does have some BPH has had cystoscope be in the past 7/2020  Has been on some Flomax/Proscar  Postvoid residual 23 mL (7/1/2024  Follows with a neurologist in [place] who had him on Aubagio    Patient has had a lot of dry mouth which could be from his medications  I noticed that he was actually on 3 medicines for his bladder and probably should stop the Ditropan    Since last seen  No hospitalization  No surgeries  No major illnesses that required care  No falls or injuries    Chronic symptoms  Adynamic/depression  Dry mouth with trouble swallowing  Ataxic  Decreased vibration sense lower extremities worse than upper    Lives with his girlfriend  They both do some cooking  Does eat  Weight is stable at around 137 pounds  Does take his vitamin D3  Aubagio filled by other neurologist    Does do art work with acrylic painting he showed me multiple projects  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  Multiple sclerosis relapsing remitting       original diagnosis 1988, symptoms going on since age 18 or around 1978       lost to follow-up, re evaluated in 2001       Distant past treated with  "ACTH/multiple burst of steroids    MRI thoracic spine May 19, 2020  A.  Solitary midthoracic chronic demyelinating plaque  B.  No active demyelination  MRI cervical spine May 19, 2020  A.  3 upper cervical cord lesions consistent with MS chronic  B.  No active demyelinating lesions  MRI head May 19, 2020  A.  Moderate to severe T2 signal changes consistent with chronic MS supratentorial lesions       as lesions above need midbrain nettie and medulla as well as supratentorial  B.  No active lesions  C.  Thinning of the corpus callosum moderate in degree  D.  Moderate to severe T1 hypointense lesions  E.  Mild cerebral atrophy      Follows with a neurologist in [place] for his immune modulating medication  Stopped Tecfidera about a year ago and went on Aubagio         MS difficulties       Past optic neuritis left greater than right       Incoordination left greater than right       Urinary urgency       Difficulty with balance and gait       Depression/mood changes       Significant fatigue         disabled from competitive gainful employment due to MS.      B.  Lymphopenia secondary to Tecfidera in the past        Aubagio for about 1 year filled by his neurologist in [place]      C.  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              Brief review of neurologic history:  1. Multiple sclerosis, relapsing-remitting first diagnosed back in 1988 by Dr. Adelso Leon originally.  2. Lost to follow up and then again seen in our clinic in 2001.  3. Previously treated with ACTH early on and then multiple bursts of steroids for exacerbations in 2001 twice and 2003 and then in 2004, 2005, 2007, " 2008, 2010, 2011, 2012. These were the large IV doses of steroids.    Patient s major difficulties have included:  1. Depression and mood problems with fairly anhedonic type symptoms.  2. Previous optic neuritis in the left eye greater than the right.  3. Difficulty with fluctuations with a lot of fatigue.  4. Incoordination worse on the left than the right, poor fine motor movements at times, symptoms worse with fatigue or being overheated.  5. Urinary urgency.  6. Gait difficulties and balance problems, which fluctuate with his fatigue, usually uses a single prong cane.    Family history:  1. Mother with MS in his biological mother.  2. Raised by adoptive parents.    Habits  Smoker  Alcohol 1-2/week  .  Previous work history summary included:  1. Worked as an  in 2004, had marked dexterity problems and troubles, was unable to continue with that occupation.  2. 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  3. 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  4. 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  5. Went back to school in 2012 at [employer] doing some computer graphics.  6. 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  8. 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    Previous treatments have included:  1. ACTH early on.  2. Multiple sclerosis treated with high-dose burst of steroids.  3. Started Copaxone in 2004 and 2005.  4. Switched to Tecfidera in April 2016 due to  needle fatigue.  5. Has been on lower doses of Tecfidera due to lymphopenia which is now chronic.      Past has had some oral bursts of steroids in:  A.  November 2016.  B.  July 2017.  C.  June 2018.  D. August 2019          Permanently disabled from competitive gainful employment  1. Disabled from competitive gainful employment which has been going on for quite some time.  2. Significant depression going on since 2003. Multiple medications tried in the past, currently on low-dose Lexapro which he tolerates.  3. A lot of fatigue phenomenon, talked about staying active and being safe with his gait.  4. Workup is outlined in the September 23, 2000 note for reference.  5. Previously had P100 latency of 160.5 on the left and 123 on the right when he had the optic neuritis.  MRI thoracic spine May 19, 2020  A.  Solitary midthoracic chronic demyelinating plaque  B.  No active demyelination  MRI cervical spine May 19, 2020  A.  3 upper cervical cord lesions consistent with MS chronic  B.  No active demyelinating lesions  MRI head May 19, 2020  A.  Moderate to severe T2 signal changes consistent with chronic MS supratentorial lesions       as lesions above need midbrain nettie and medulla as well as supratentorial  B.  No active lesions  C.  Thinning of the corpus callosum moderate in degree  D.  Moderate to severe T1 hypointense lesions  E.  Mild cerebral atrophy    Laboratory data review  Recent lymphocyte counts:  1. October 2016, 15%, absolute count 0.6.  2. January 25, 2017 lymphocyte count 20%, absolute 1.0 on lower dose of Tecfidera.  3. July 19, 2017 lymphocyte count 18% with absolute count 0.7.  4. June 2018 lymphocyte 13% with absolute count 0.5.  5. March 2019, lymphocyte percent 13, absolute count 0.7    Laboratory review                        6/2019 8/2019 5/4/2022  Wbc/HGB     5.0/13.9      4.6/13.4     4.5/14.1  PLTs            193,000       183,000     166,000  Lymph           13/0.7          13/0.6        14/0.6   %/ABS    NA/K                                                   141/4.2  BUN/CR                                              17/1.02  GLU                                                     97  AST               23                                  28  B12               439  TSH              1.96    Vitamin D                            45.2      MoCA  5/4/2022     27 out of 30  7/1/2024     26 out of 30    Exam      Review of Systems     Pertinent findings on review of systems  Difficulty with fatigue  Difficulty with fluctuating mood  No actual diplopia dysarthria dysphasia  Clumsiness more on the left side  Gait difficulties with ataxia should be climbing on high ladders  Does use a walking stick    Does have some difficulty with his heartburn  Little bit of blurry vision intermittently    Has been placed on Flomax for some urinary urgency more so at nighttime still has it though  Also uses some Ditropan follows with urology, postvoid residual small    General exam  Blood pressure 113/65, pulse 76  Alert orient x3  HEENT normal  Lungs clear  Heart regular  Abdomen soft  Symmetrical pulses  No edema the feet    Neurologic exam  Alert oriented x3  Prosody speech normal  Naming normal  Repetition normal  Comprehension normal  No neglect  Memory recall okay    MoCA  5/4/2022     27 out of 30  7/1/2024     26 out of 30        Cranial 2 through 12 significant for  No ophthalmoplegia  Rare beat of nystagmus nonsustained intermittent subtle  Visual fields intact  No Jamie Dandy phenomenon  Decreased vision in the left eye compared to the right  Slight red desaturation on the left compared to the right subtle  Optokinetic nystagmus seems intact      Upper extremities  Intention tremor bilaterally mild fluctuates  Dysmetria greater on the left than the right    Lower extremities  Relatively normal strength distal and proximal with negative straight leg raising sign    Gait  Slightly wide-based  stance  does better with a assistive device if he is on uneven surfaces talked about gait safety  Some difficulty with marching in place with his eyes closed even with his legs far apart    Reflexes fairly brisk no clonus equivocal Casey on the left  Reflexes brisker on the left than the right    Decreased vibratory sense in the lower extremities compared to the upper        Assessment/Plan       1.  Multiple Sclerosis, Relapsing-Remitting (G35)        treated with Aubagio through his neurologist in [place] who is prescribing the medication      2.  Lymphopenia (D72.810)         Recheck CBC and platelets    3.  Left leg pain (M79.605)       Could be from his MS and some spasticity gets tired with too many medications      Add Theraworks   topical magnesium rub    4.  Vertigo treated with meclizine as needed    5.  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       Currently is able to drive, does have a disability parking permit due to his high risk for falls.    MoCA  5/4/2022     27 out of 30  7/1/2024     26 out of 30        Aubagio through his neurologist in [place]  Neurontin 300 mg capsule, 2 p.o. nightly for sleep and dysesthesias  Baclofen 10 mg p.o. 3 times daily for spasticity (sometimes just uses twice per day)  Meclizine 25 mg 3 times daily as needed vertigo  Lexapro 10 mg once per day    Discussed depression and treatment    Recommend getting laboratory data  Refilled medications  Labs ordered    Multiple issues as above discussed and reviewed  Follow-up in 1 year    Total care time today 40 minutes  The longitudinal plan of care for the diagnosis(es)/condition(s) as documented were addressed during this visit. Due to the added complexity in care, I will continue to support Festus in the subsequent  management and with ongoing continuity of care.      As per the visit today  Reviewed EMR notes  Reviewed urology notes  Reviewed medications  Refilled medications and discussed side effects and benefits  Labs ordered      Again, thank you for allowing me to participate in the care of your patient.        Sincerely,        jovany Mc MD

## 2024-07-01 NOTE — NURSING NOTE
Chief Complaint   Patient presents with    MS     Last seen 5/2022. Pt states he is doing ok. No new concerns.     Cristina Bo LPN on 7/1/2024 at 10:53 AM

## 2024-07-01 NOTE — NURSING NOTE
[facility] COGNITIVE ASSESSMENT ([facility])  Version 7.1 Original Version  VISUOSPATIAL/EXECUTIVE               COPY CUBE      [  0  ]                                [ 1   ] DRAW CLOCK (Ten past eleven)  (3 points)    [  1  ]                    [  1  ]               [  1  ]       Contour            Numbers     Hands POINTS                  4 / 5   NAMING    [  1 ]                                                                        [  1  ]                                             [  1  ]  Lifredo Guadalupe                                Camel                    3 / 3   MEMORY Read list of words, subject must repeat them. Do 2 trials, even if 1st trial is successful. Do a recall after 5 minutes  FACE VELVET Christian ELISA RED No Points    1st          2nd         ATTENTION Read list of digits (1 digit/sec) Subject has to repeat in the forward order       [  1  ]   2  1  8  5  4                                [ 1   ] 7 4 2                         2 /2   Read list of letters. The subject must tap with his hand at each letter A. No points if > 2 errors.  [ 1   ] F B A C M N A A J K L B A F A K D E A A A J A M O F A A B             1 /1   Serial 7 subtraction starting at 100          [ 1   ] 93         [  1  ] 86          [   0 ] 79          [ 0   ] 72         [   0 ] 65   4 or 5 correct subtractions: 3 points,  2 or 3 correct: 2 points,  1correct: 1 point,   0 correct: 0 points           2 /3   LANGUAGE Repeat: I only know that Kristian is the one to help today. [   1  ]                                      The cat always hid under the couch when dogs were in the room. [  1 ]              2 /2   Fluency: Name maximum number of words in one minute that begin with the letter F                                                                                                                    [  0  ] __4_ (N > 11 words)              0 /1   ABSTRACTION Similarity  between e.g. banana-orange=fruit                                                                   [  1  ] train-bicycle                      [  1  ] watch-ruler             2 /2   DELAYED  RECALL Has to recall words  WITH NO CUE FACE  [   1 ] VELVET  [  1  ] Sikh  [   1 ]  ELISA  [    0] RED  [   1 ] Points for UNCUED recall only           4 /5           OPTIONAL Category cue           Multiple choice cue          ORIENTATION  [  1  ] Date     [  1  ] Month       [   1 ] Year      [   1 ] Day      [  1  ] Place        [   1 ] City         6 /6   TOTAL  Normal > 26/30 Add 1 point if < 12 years education       26 /30

## 2025-08-19 DIAGNOSIS — G35 RELAPSING REMITTING MULTIPLE SCLEROSIS (H): ICD-10-CM

## 2025-08-19 RX ORDER — GABAPENTIN 300 MG/1
CAPSULE ORAL
Qty: 180 CAPSULE | Refills: 2 | Status: SHIPPED | OUTPATIENT
Start: 2025-08-19